# Patient Record
Sex: MALE | Race: WHITE
[De-identification: names, ages, dates, MRNs, and addresses within clinical notes are randomized per-mention and may not be internally consistent; named-entity substitution may affect disease eponyms.]

---

## 2019-01-25 ENCOUNTER — HOSPITAL ENCOUNTER (INPATIENT)
Dept: HOSPITAL 94 - ER | Age: 79
LOS: 1 days | Discharge: HOME | DRG: 640 | End: 2019-01-26
Attending: HOSPITALIST | Admitting: INTERNAL MEDICINE
Payer: MEDICARE

## 2019-01-25 VITALS — SYSTOLIC BLOOD PRESSURE: 154 MMHG | DIASTOLIC BLOOD PRESSURE: 74 MMHG

## 2019-01-25 VITALS — HEIGHT: 69 IN | WEIGHT: 171.52 LBS | BODY MASS INDEX: 25.4 KG/M2

## 2019-01-25 VITALS — DIASTOLIC BLOOD PRESSURE: 74 MMHG | SYSTOLIC BLOOD PRESSURE: 131 MMHG

## 2019-01-25 VITALS — DIASTOLIC BLOOD PRESSURE: 74 MMHG | SYSTOLIC BLOOD PRESSURE: 154 MMHG

## 2019-01-25 VITALS — SYSTOLIC BLOOD PRESSURE: 145 MMHG | DIASTOLIC BLOOD PRESSURE: 84 MMHG

## 2019-01-25 DIAGNOSIS — I10: ICD-10-CM

## 2019-01-25 DIAGNOSIS — G93.41: ICD-10-CM

## 2019-01-25 DIAGNOSIS — E78.5: ICD-10-CM

## 2019-01-25 DIAGNOSIS — E78.00: ICD-10-CM

## 2019-01-25 DIAGNOSIS — F32.9: ICD-10-CM

## 2019-01-25 DIAGNOSIS — R79.89: ICD-10-CM

## 2019-01-25 DIAGNOSIS — Z79.899: ICD-10-CM

## 2019-01-25 DIAGNOSIS — N39.0: ICD-10-CM

## 2019-01-25 DIAGNOSIS — E86.0: Primary | ICD-10-CM

## 2019-01-25 LAB
ALBUMIN SERPL BCP-MCNC: 3.7 G/DL (ref 3.4–5)
ALBUMIN/GLOB SERPL: 0.9 {RATIO} (ref 1.1–1.5)
ALP SERPL-CCNC: 109 IU/L (ref 46–116)
ALT SERPL W P-5'-P-CCNC: 17 U/L (ref 12–78)
AMORPH URATE CRY #/AREA URNS HPF: (no result) /[HPF]
ANION GAP SERPL CALCULATED.3IONS-SCNC: 12 MMOL/L (ref 8–16)
APTT PPP: 31 SECONDS (ref 22–32)
AST SERPL W P-5'-P-CCNC: 13 U/L (ref 10–37)
BACTERIA URNS QL MICRO: (no result) /HPF
BASOPHILS # BLD AUTO: 0 X10'3 (ref 0–0.2)
BASOPHILS NFR BLD AUTO: 0.3 % (ref 0–1)
BILIRUB SERPL-MCNC: 1 MG/DL (ref 0.1–1)
BUN SERPL-MCNC: 17 MG/DL (ref 7–18)
BUN/CREAT SERPL: 16.8 (ref 5.4–32)
CALCIUM SERPL-MCNC: 8.7 MG/DL (ref 8.5–10.1)
CAOX CRY URNS QL MICRO: (no result) /HPF
CHLORIDE SERPL-SCNC: 102 MMOL/L (ref 99–107)
CLARITY UR: (no result)
CO2 SERPL-SCNC: 24.1 MMOL/L (ref 24–32)
COLOR UR: YELLOW
CREAT SERPL-MCNC: 1.01 MG/DL (ref 0.6–1.1)
DEPRECATED SQUAMOUS URNS QL MICRO: (no result) /LPF
EOSINOPHIL # BLD AUTO: 0 X10'3 (ref 0–0.9)
EOSINOPHIL NFR BLD AUTO: 0.2 % (ref 0–6)
ERYTHROCYTE [DISTWIDTH] IN BLOOD BY AUTOMATED COUNT: 13.9 % (ref 11.5–14.5)
GFR SERPL CREATININE-BSD FRML MDRD: 71 ML/MIN
GLUCOSE SERPL-MCNC: 106 MG/DL (ref 70–104)
GLUCOSE UR STRIP-MCNC: NEGATIVE MG/DL
HCT VFR BLD AUTO: 50.7 % (ref 42–52)
HGB BLD-MCNC: 17.3 G/DL (ref 14–17.9)
HGB UR QL STRIP: (no result)
INR PPP: 1 INR
KETONES UR STRIP-MCNC: (no result) MG/DL
LEUKOCYTE ESTERASE UR QL STRIP: NEGATIVE
LYMPHOCYTES # BLD AUTO: 0.9 X10'3 (ref 1.1–4.8)
LYMPHOCYTES NFR BLD AUTO: 8.9 % (ref 21–51)
MCH RBC QN AUTO: 29.4 PG (ref 27–31)
MCHC RBC AUTO-ENTMCNC: 34.1 % (ref 33–36.5)
MCV RBC AUTO: 86.4 FL (ref 78–98)
MONOCYTES # BLD AUTO: 0.8 X10'3 (ref 0–0.9)
MONOCYTES NFR BLD AUTO: 8.7 % (ref 2–12)
MUCOUS THREADS URNS QL MICRO: (no result) /LPF
NEUTROPHILS # BLD AUTO: 7.9 X10'3 (ref 1.8–7.7)
NEUTROPHILS NFR BLD AUTO: 81.9 % (ref 42–75)
NITRITE UR QL STRIP: NEGATIVE
PH UR STRIP: 6 [PH] (ref 4.8–8)
PLATELET # BLD AUTO: 170 X10'3 (ref 140–440)
PMV BLD AUTO: 8.2 FL (ref 7.4–10.4)
POTASSIUM SERPL-SCNC: 3.6 MMOL/L (ref 3.5–5.1)
PROT SERPL-MCNC: 7.8 G/DL (ref 6.4–8.2)
PROT UR QL STRIP: 30 MG/DL
PROTHROMBIN TIME: 10.6 SECONDS (ref 9–12)
RBC # BLD AUTO: 5.87 X10'6 (ref 4.7–6.1)
RBC #/AREA URNS HPF: (no result) /HPF (ref 0–2)
SODIUM SERPL-SCNC: 138 MMOL/L (ref 135–145)
SP GR UR STRIP: >=1.03 (ref 1–1.03)
URN COLLECT METHOD CLASS: (no result)
UROBILINOGEN UR STRIP-MCNC: 0.2 E.U/DL (ref 0.2–1)
WBC # BLD AUTO: 9.6 X10'3 (ref 4.5–11)
WBC #/AREA URNS HPF: (no result) /HPF (ref 0–4)

## 2019-01-25 PROCEDURE — 93306 TTE W/DOPPLER COMPLETE: CPT

## 2019-01-25 PROCEDURE — 80048 BASIC METABOLIC PNL TOTAL CA: CPT

## 2019-01-25 PROCEDURE — 81001 URINALYSIS AUTO W/SCOPE: CPT

## 2019-01-25 PROCEDURE — 71045 X-RAY EXAM CHEST 1 VIEW: CPT

## 2019-01-25 PROCEDURE — 84484 ASSAY OF TROPONIN QUANT: CPT

## 2019-01-25 PROCEDURE — 97530 THERAPEUTIC ACTIVITIES: CPT

## 2019-01-25 PROCEDURE — 70551 MRI BRAIN STEM W/O DYE: CPT

## 2019-01-25 PROCEDURE — 70450 CT HEAD/BRAIN W/O DYE: CPT

## 2019-01-25 PROCEDURE — 36415 COLL VENOUS BLD VENIPUNCTURE: CPT

## 2019-01-25 PROCEDURE — 87088 URINE BACTERIA CULTURE: CPT

## 2019-01-25 PROCEDURE — 93880 EXTRACRANIAL BILAT STUDY: CPT

## 2019-01-25 PROCEDURE — 85025 COMPLETE CBC W/AUTO DIFF WBC: CPT

## 2019-01-25 PROCEDURE — 80053 COMPREHEN METABOLIC PANEL: CPT

## 2019-01-25 PROCEDURE — 85610 PROTHROMBIN TIME: CPT

## 2019-01-25 PROCEDURE — 93005 ELECTROCARDIOGRAM TRACING: CPT

## 2019-01-25 PROCEDURE — 97116 GAIT TRAINING THERAPY: CPT

## 2019-01-25 PROCEDURE — 80061 LIPID PANEL: CPT

## 2019-01-25 PROCEDURE — 99285 EMERGENCY DEPT VISIT HI MDM: CPT

## 2019-01-25 PROCEDURE — 87070 CULTURE OTHR SPECIMN AEROBIC: CPT

## 2019-01-25 PROCEDURE — 97162 PT EVAL MOD COMPLEX 30 MIN: CPT

## 2019-01-25 PROCEDURE — 96365 THER/PROPH/DIAG IV INF INIT: CPT

## 2019-01-25 PROCEDURE — 85730 THROMBOPLASTIN TIME PARTIAL: CPT

## 2019-01-25 PROCEDURE — 83735 ASSAY OF MAGNESIUM: CPT

## 2019-01-25 RX ADMIN — HEPARIN SODIUM SCH UNIT: 5000 INJECTION, SOLUTION INTRAVENOUS; SUBCUTANEOUS at 19:14

## 2019-01-25 NOTE — NUR
PAGER ID:  [phone] 

MESSAGE:  Constance Castillo in 6255i- MRI looks inconclusive, states infarct present 
on rt but also states diffusion images neg for stroke. There are no parameters to give 
lisinopril (permissive htn x 24 hrs?) pls call me thank you

-------------------------------------------------------------------------------

Addendum: 01/25/19 at 1824 by Marleny Banuelos RN

-------------------------------------------------------------------------------

per MD grider to hold lisinopril at this time

## 2019-01-26 VITALS — SYSTOLIC BLOOD PRESSURE: 132 MMHG | DIASTOLIC BLOOD PRESSURE: 68 MMHG

## 2019-01-26 VITALS — DIASTOLIC BLOOD PRESSURE: 76 MMHG | SYSTOLIC BLOOD PRESSURE: 145 MMHG

## 2019-01-26 VITALS — DIASTOLIC BLOOD PRESSURE: 77 MMHG | SYSTOLIC BLOOD PRESSURE: 125 MMHG

## 2019-01-26 LAB
ALBUMIN SERPL BCP-MCNC: 3.2 G/DL (ref 3.4–5)
ANION GAP SERPL CALCULATED.3IONS-SCNC: 14 MMOL/L (ref 8–16)
BASOPHILS # BLD AUTO: 0 X10'3 (ref 0–0.2)
BASOPHILS NFR BLD AUTO: 0.2 % (ref 0–1)
BUN SERPL-MCNC: 15 MG/DL (ref 7–18)
BUN/CREAT SERPL: 15.5 (ref 5.4–32)
CALCIUM SERPL-MCNC: 8.6 MG/DL (ref 8.5–10.1)
CHLORIDE SERPL-SCNC: 102 MMOL/L (ref 99–107)
CHOLEST SERPL-MCNC: 156 MG/DL (ref 0–200)
CHOLEST/HDLC SERPL: 4.1 {RATIO} (ref 0–4.99)
CO2 SERPL-SCNC: 23.6 MMOL/L (ref 24–32)
CREAT SERPL-MCNC: 0.97 MG/DL (ref 0.6–1.1)
EOSINOPHIL # BLD AUTO: 0.1 X10'3 (ref 0–0.9)
EOSINOPHIL NFR BLD AUTO: 1.6 % (ref 0–6)
ERYTHROCYTE [DISTWIDTH] IN BLOOD BY AUTOMATED COUNT: 13.9 % (ref 11.5–14.5)
GFR SERPL CREATININE-BSD FRML MDRD: 75 ML/MIN
GLUCOSE SERPL-MCNC: 103 MG/DL (ref 70–104)
HCT VFR BLD AUTO: 48.1 % (ref 42–52)
HDLC SERPL-MCNC: 38 MG/DL (ref 35–60)
HGB BLD-MCNC: 16.8 G/DL (ref 14–17.9)
LDLC SERPL DIRECT ASSAY-MCNC: 108 MG/DL (ref 50–100)
LYMPHOCYTES # BLD AUTO: 1 X10'3 (ref 1.1–4.8)
LYMPHOCYTES NFR BLD AUTO: 10.8 % (ref 21–51)
MAGNESIUM SERPL-MCNC: 1.7 MG/DL (ref 1.5–2.4)
MCH RBC QN AUTO: 29.7 PG (ref 27–31)
MCHC RBC AUTO-ENTMCNC: 34.9 % (ref 33–36.5)
MCV RBC AUTO: 85.1 FL (ref 78–98)
MONOCYTES # BLD AUTO: 1 X10'3 (ref 0–0.9)
MONOCYTES NFR BLD AUTO: 11.3 % (ref 2–12)
NEUTROPHILS # BLD AUTO: 7 X10'3 (ref 1.8–7.7)
NEUTROPHILS NFR BLD AUTO: 76.1 % (ref 42–75)
PLATELET # BLD AUTO: 164 X10'3 (ref 140–440)
PMV BLD AUTO: 8.5 FL (ref 7.4–10.4)
POTASSIUM SERPL-SCNC: 3.5 MMOL/L (ref 3.5–5.1)
RBC # BLD AUTO: 5.65 X10'6 (ref 4.7–6.1)
SODIUM SERPL-SCNC: 140 MMOL/L (ref 135–145)
TRIGL SERPL-MCNC: 94 MG/DL (ref 20–135)
WBC # BLD AUTO: 9.1 X10'3 (ref 4.5–11)

## 2019-01-26 RX ADMIN — HEPARIN SODIUM SCH UNIT: 5000 INJECTION, SOLUTION INTRAVENOUS; SUBCUTANEOUS at 10:25

## 2019-01-26 RX ADMIN — HEPARIN SODIUM SCH UNIT: 5000 INJECTION, SOLUTION INTRAVENOUS; SUBCUTANEOUS at 01:35

## 2019-01-26 NOTE — NUR
Spoke to Dr Gonzalez re pt's ambiguous MRI read from last night- he confirmed that there is 
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 
Discussed with Tracee the stroke RN and she agrees

## 2019-01-26 NOTE — NUR
PAGER ID:  [phone] 

MESSAGE:  Constance [phone] re Mr Harpreet quevedo in 6068x- His workup looks negative, family at 
bedside wondering about dc. Thank you

## 2019-01-26 NOTE — NUR
PAGER ID:  [phone] 

MESSAGE:  Constance [phone] re Harpreet Castillo in 1354y- Does he need home rx for asa? because of the 
prior stroke?

## 2020-01-15 ENCOUNTER — HOSPITAL ENCOUNTER (EMERGENCY)
Dept: HOSPITAL 94 - ER | Age: 80
Discharge: HOME | End: 2020-01-15
Payer: MEDICARE

## 2020-01-15 VITALS — DIASTOLIC BLOOD PRESSURE: 73 MMHG | SYSTOLIC BLOOD PRESSURE: 157 MMHG

## 2020-01-15 VITALS — BODY MASS INDEX: 29.07 KG/M2 | HEIGHT: 67 IN | WEIGHT: 185.19 LBS

## 2020-01-15 DIAGNOSIS — L03.116: Primary | ICD-10-CM

## 2020-01-15 DIAGNOSIS — R79.1: ICD-10-CM

## 2020-01-15 DIAGNOSIS — L03.115: ICD-10-CM

## 2020-01-15 DIAGNOSIS — F03.90: ICD-10-CM

## 2020-01-15 DIAGNOSIS — E78.00: ICD-10-CM

## 2020-01-15 DIAGNOSIS — Z79.82: ICD-10-CM

## 2020-01-15 DIAGNOSIS — Z79.899: ICD-10-CM

## 2020-01-15 LAB
ALBUMIN SERPL BCP-MCNC: 3.6 G/DL (ref 3.4–5)
ALBUMIN/GLOB SERPL: 0.8 {RATIO} (ref 1.1–1.5)
ALP SERPL-CCNC: 127 IU/L (ref 46–116)
ALT SERPL W P-5'-P-CCNC: 29 U/L (ref 12–78)
ANION GAP SERPL CALCULATED.3IONS-SCNC: 5 MMOL/L (ref 8–16)
APTT PPP: 28 SECONDS (ref 22–32)
AST SERPL W P-5'-P-CCNC: 22 U/L (ref 10–37)
BACTERIA URNS QL MICRO: (no result) /HPF
BASOPHILS # BLD AUTO: 0.1 X10'3 (ref 0–0.2)
BASOPHILS NFR BLD AUTO: 0.6 % (ref 0–1)
BILIRUB SERPL-MCNC: 0.6 MG/DL (ref 0.1–1)
BUN SERPL-MCNC: 18 MG/DL (ref 7–18)
BUN/CREAT SERPL: 19.1 (ref 5.4–32)
CALCIUM SERPL-MCNC: 9 MG/DL (ref 8.5–10.1)
CAOX CRY URNS QL MICRO: (no result) /HPF
CHLORIDE SERPL-SCNC: 107 MMOL/L (ref 99–107)
CLARITY UR: (no result)
CO2 SERPL-SCNC: 30.6 MMOL/L (ref 24–32)
COLOR UR: YELLOW
CREAT SERPL-MCNC: 0.94 MG/DL (ref 0.6–1.1)
DEPRECATED SQUAMOUS URNS QL MICRO: (no result) /LPF
EOSINOPHIL # BLD AUTO: 0.3 X10'3 (ref 0–0.9)
EOSINOPHIL NFR BLD AUTO: 2.9 % (ref 0–6)
ERYTHROCYTE [DISTWIDTH] IN BLOOD BY AUTOMATED COUNT: 14.8 % (ref 11.5–14.5)
GFR SERPL CREATININE-BSD FRML MDRD: 77 ML/MIN
GLUCOSE SERPL-MCNC: 96 MG/DL (ref 70–104)
GLUCOSE UR STRIP-MCNC: NEGATIVE MG/DL
HCT VFR BLD AUTO: 49 % (ref 42–52)
HGB BLD-MCNC: 16.6 G/DL (ref 14–17.9)
HGB UR QL STRIP: (no result)
KETONES UR STRIP-MCNC: NEGATIVE MG/DL
LEUKOCYTE ESTERASE UR QL STRIP: NEGATIVE
LYMPHOCYTES # BLD AUTO: 1.3 X10'3 (ref 1.1–4.8)
LYMPHOCYTES NFR BLD AUTO: 14.8 % (ref 21–51)
MAGNESIUM SERPL-MCNC: 2.2 MG/DL (ref 1.5–2.4)
MCH RBC QN AUTO: 28.4 PG (ref 27–31)
MCHC RBC AUTO-ENTMCNC: 33.8 G/DL (ref 33–36.5)
MCV RBC AUTO: 84 FL (ref 78–98)
MONOCYTES # BLD AUTO: 0.9 X10'3 (ref 0–0.9)
MONOCYTES NFR BLD AUTO: 9.9 % (ref 2–12)
MUCOUS THREADS URNS QL MICRO: (no result) /LPF
NEUTROPHILS # BLD AUTO: 6.5 X10'3 (ref 1.8–7.7)
NEUTROPHILS NFR BLD AUTO: 71.8 % (ref 42–75)
NITRITE UR QL STRIP: NEGATIVE
PH UR STRIP: 6 [PH] (ref 4.8–8)
PLATELET # BLD AUTO: 200 X10'3 (ref 140–440)
PMV BLD AUTO: 8.6 FL (ref 7.4–10.4)
POTASSIUM SERPL-SCNC: 3.6 MMOL/L (ref 3.5–5.1)
PROT SERPL-MCNC: 7.9 G/DL (ref 6.4–8.2)
PROT UR QL STRIP: 30 MG/DL
RBC # BLD AUTO: 5.84 X10'6 (ref 4.7–6.1)
RBC #/AREA URNS HPF: (no result) /HPF (ref 0–2)
SODIUM SERPL-SCNC: 143 MMOL/L (ref 135–145)
SP GR UR STRIP: 1.02 (ref 1–1.03)
TROPONIN I SERPL-MCNC: < 0.04 NG/ML (ref 0–0.05)
URN COLLECT METHOD CLASS: (no result)
UROBILINOGEN UR STRIP-MCNC: 0.2 E.U/DL (ref 0.2–1)
WBC # BLD AUTO: 9.1 X10'3 (ref 4.5–11)
WBC #/AREA URNS HPF: (no result) /HPF (ref 0–4)

## 2020-01-15 PROCEDURE — 99284 EMERGENCY DEPT VISIT MOD MDM: CPT

## 2020-01-15 PROCEDURE — 85610 PROTHROMBIN TIME: CPT

## 2020-01-15 PROCEDURE — 84484 ASSAY OF TROPONIN QUANT: CPT

## 2020-01-15 PROCEDURE — 71045 X-RAY EXAM CHEST 1 VIEW: CPT

## 2020-01-15 PROCEDURE — 85025 COMPLETE CBC W/AUTO DIFF WBC: CPT

## 2020-01-15 PROCEDURE — 93005 ELECTROCARDIOGRAM TRACING: CPT

## 2020-01-15 PROCEDURE — 84145 PROCALCITONIN (PCT): CPT

## 2020-01-15 PROCEDURE — 83605 ASSAY OF LACTIC ACID: CPT

## 2020-01-15 PROCEDURE — 81001 URINALYSIS AUTO W/SCOPE: CPT

## 2020-01-15 PROCEDURE — 83880 ASSAY OF NATRIURETIC PEPTIDE: CPT

## 2020-01-15 PROCEDURE — 36415 COLL VENOUS BLD VENIPUNCTURE: CPT

## 2020-01-15 PROCEDURE — 80053 COMPREHEN METABOLIC PANEL: CPT

## 2020-01-15 PROCEDURE — 85730 THROMBOPLASTIN TIME PARTIAL: CPT

## 2020-01-15 PROCEDURE — 87040 BLOOD CULTURE FOR BACTERIA: CPT

## 2020-01-15 PROCEDURE — 83735 ASSAY OF MAGNESIUM: CPT

## 2020-01-15 NOTE — NUR
Alfonoz "[employer] card given to me by EMS for transport home when needed. Patient 
is in rm 205, Contact Leopoldo Lovelace at [phone].

## 2020-05-01 ENCOUNTER — HOSPITAL ENCOUNTER (EMERGENCY)
Dept: HOSPITAL 94 - ER | Age: 80
Discharge: HOME | End: 2020-05-01
Payer: MEDICARE

## 2020-05-01 VITALS — WEIGHT: 230.49 LBS | HEIGHT: 70 IN | BODY MASS INDEX: 33 KG/M2

## 2020-05-01 VITALS — SYSTOLIC BLOOD PRESSURE: 142 MMHG | DIASTOLIC BLOOD PRESSURE: 61 MMHG

## 2020-05-01 DIAGNOSIS — R06.02: ICD-10-CM

## 2020-05-01 DIAGNOSIS — Z79.2: ICD-10-CM

## 2020-05-01 DIAGNOSIS — E78.00: ICD-10-CM

## 2020-05-01 DIAGNOSIS — R07.9: Primary | ICD-10-CM

## 2020-05-01 DIAGNOSIS — Z79.82: ICD-10-CM

## 2020-05-01 DIAGNOSIS — Z79.899: ICD-10-CM

## 2020-05-01 LAB
ALBUMIN SERPL BCP-MCNC: 3 G/DL (ref 3.4–5)
ALBUMIN/GLOB SERPL: 0.6 {RATIO} (ref 1.1–1.5)
ALP SERPL-CCNC: 127 IU/L (ref 46–116)
ALT SERPL W P-5'-P-CCNC: 18 U/L (ref 12–78)
ANION GAP SERPL CALCULATED.3IONS-SCNC: 5 MMOL/L (ref 8–16)
AST SERPL W P-5'-P-CCNC: 20 U/L (ref 10–37)
BASOPHILS # BLD AUTO: 0.1 X10'3 (ref 0–0.2)
BASOPHILS NFR BLD AUTO: 0.7 % (ref 0–1)
BILIRUB SERPL-MCNC: 0.2 MG/DL (ref 0.1–1)
BUN SERPL-MCNC: 12 MG/DL (ref 7–18)
BUN/CREAT SERPL: 9.4 (ref 5.4–32)
CALCIUM SERPL-MCNC: 8.9 MG/DL (ref 8.5–10.1)
CHLORIDE SERPL-SCNC: 104 MMOL/L (ref 99–107)
CO2 SERPL-SCNC: 31.1 MMOL/L (ref 24–32)
CREAT SERPL-MCNC: 1.27 MG/DL (ref 0.6–1.1)
EOSINOPHIL # BLD AUTO: 0.4 X10'3 (ref 0–0.9)
EOSINOPHIL NFR BLD AUTO: 3 % (ref 0–6)
ERYTHROCYTE [DISTWIDTH] IN BLOOD BY AUTOMATED COUNT: 16.1 % (ref 11.5–14.5)
GFR SERPL CREATININE-BSD FRML MDRD: 55 ML/MIN
GLUCOSE SERPL-MCNC: 106 MG/DL (ref 70–104)
HCT VFR BLD AUTO: 45 % (ref 42–52)
HGB BLD-MCNC: 14.7 G/DL (ref 14–17.9)
LYMPHOCYTES # BLD AUTO: 1.2 X10'3 (ref 1.1–4.8)
LYMPHOCYTES NFR BLD AUTO: 9.4 % (ref 21–51)
MCH RBC QN AUTO: 27 PG (ref 27–31)
MCHC RBC AUTO-ENTMCNC: 32.6 G/DL (ref 33–36.5)
MCV RBC AUTO: 82.9 FL (ref 78–98)
MONOCYTES # BLD AUTO: 1.2 X10'3 (ref 0–0.9)
MONOCYTES NFR BLD AUTO: 9.4 % (ref 2–12)
NEUTROPHILS # BLD AUTO: 9.8 X10'3 (ref 1.8–7.7)
NEUTROPHILS NFR BLD AUTO: 77.5 % (ref 42–75)
PLATELET # BLD AUTO: 270 X10'3 (ref 140–440)
PMV BLD AUTO: 8 FL (ref 7.4–10.4)
POTASSIUM SERPL-SCNC: 4.4 MMOL/L (ref 3.5–5.1)
PROT SERPL-MCNC: 7.8 G/DL (ref 6.4–8.2)
RBC # BLD AUTO: 5.43 X10'6 (ref 4.7–6.1)
SODIUM SERPL-SCNC: 140 MMOL/L (ref 135–145)
WBC # BLD AUTO: 12.7 X10'3 (ref 4.5–11)

## 2020-05-01 PROCEDURE — 85025 COMPLETE CBC W/AUTO DIFF WBC: CPT

## 2020-05-01 PROCEDURE — 36415 COLL VENOUS BLD VENIPUNCTURE: CPT

## 2020-05-01 PROCEDURE — 80053 COMPREHEN METABOLIC PANEL: CPT

## 2020-05-01 PROCEDURE — 84484 ASSAY OF TROPONIN QUANT: CPT

## 2020-05-01 PROCEDURE — 93005 ELECTROCARDIOGRAM TRACING: CPT

## 2020-05-01 PROCEDURE — 71045 X-RAY EXAM CHEST 1 VIEW: CPT

## 2020-05-01 PROCEDURE — 99285 EMERGENCY DEPT VISIT HI MDM: CPT

## 2020-05-01 NOTE — NUR
Daniel called back and said that S[facility] the caregiver would be picking the patient 
up in about 15 minutes

## 2020-05-01 NOTE — NUR
patients POA named Daniel is going to pick this patient up and take him back to 
the facility he came from. Patient awaiting ride and has discharge orders.

## 2020-06-18 ENCOUNTER — HOSPITAL ENCOUNTER (OUTPATIENT)
Dept: HOSPITAL 94 - WOUND CARE | Age: 80
Discharge: HOME | End: 2020-06-18
Attending: NURSE PRACTITIONER
Payer: MEDICARE

## 2020-06-18 DIAGNOSIS — Z90.49: ICD-10-CM

## 2020-06-18 DIAGNOSIS — Z79.82: ICD-10-CM

## 2020-06-18 DIAGNOSIS — I83.011: Primary | ICD-10-CM

## 2020-06-18 DIAGNOSIS — E78.00: ICD-10-CM

## 2020-06-18 DIAGNOSIS — Z79.2: ICD-10-CM

## 2020-06-18 DIAGNOSIS — L97.821: ICD-10-CM

## 2020-06-18 DIAGNOSIS — Z79.899: ICD-10-CM

## 2020-06-18 DIAGNOSIS — F03.90: ICD-10-CM

## 2020-06-18 DIAGNOSIS — I83.012: ICD-10-CM

## 2020-06-18 DIAGNOSIS — I10: ICD-10-CM

## 2020-06-18 DIAGNOSIS — L97.211: ICD-10-CM

## 2020-06-18 DIAGNOSIS — L97.111: ICD-10-CM

## 2020-06-18 DIAGNOSIS — I83.018: ICD-10-CM

## 2020-06-18 DIAGNOSIS — I83.028: ICD-10-CM

## 2020-06-18 DIAGNOSIS — L97.811: ICD-10-CM

## 2020-06-18 LAB
ALBUMIN SERPL BCP-MCNC: 3 G/DL (ref 3.4–5)
ALBUMIN/GLOB SERPL: 0.6 {RATIO} (ref 1.1–1.5)
ALP SERPL-CCNC: 121 IU/L (ref 46–116)
ALT SERPL W P-5'-P-CCNC: 19 U/L (ref 12–78)
ANION GAP SERPL CALCULATED.3IONS-SCNC: 7 MMOL/L (ref 8–16)
AST SERPL W P-5'-P-CCNC: 20 U/L (ref 10–37)
BASOPHILS # BLD AUTO: 0 X10'3 (ref 0–0.2)
BASOPHILS NFR BLD AUTO: 0.3 % (ref 0–1)
BILIRUB SERPL-MCNC: 0.3 MG/DL (ref 0.1–1)
BUN SERPL-MCNC: 12 MG/DL (ref 7–18)
BUN/CREAT SERPL: 10.9 (ref 5.4–32)
CALCIUM SERPL-MCNC: 9.2 MG/DL (ref 8.5–10.1)
CHLORIDE SERPL-SCNC: 104 MMOL/L (ref 99–107)
CO2 SERPL-SCNC: 28.9 MMOL/L (ref 24–32)
CREAT SERPL-MCNC: 1.1 MG/DL (ref 0.6–1.1)
CRP SERPL HS-MCNC: 2.8 MG/DL (ref 0–0.5)
EOSINOPHIL # BLD AUTO: 0.4 X10'3 (ref 0–0.9)
EOSINOPHIL NFR BLD AUTO: 2.7 % (ref 0–6)
ERYTHROCYTE [DISTWIDTH] IN BLOOD BY AUTOMATED COUNT: 15.6 % (ref 11.5–14.5)
GFR SERPL CREATININE-BSD FRML MDRD: 64 ML/MIN
GLUCOSE SERPL-MCNC: 91 MG/DL (ref 70–104)
HBA1C MFR BLD: 5.9 % (ref 4.5–6.2)
HCT VFR BLD AUTO: 42.7 % (ref 42–52)
HGB BLD-MCNC: 14 G/DL (ref 14–17.9)
LYMPHOCYTES # BLD AUTO: 1 X10'3 (ref 1.1–4.8)
LYMPHOCYTES NFR BLD AUTO: 7.6 % (ref 21–51)
MCH RBC QN AUTO: 26.9 PG (ref 27–31)
MCHC RBC AUTO-ENTMCNC: 32.8 G/DL (ref 33–36.5)
MCV RBC AUTO: 82.1 FL (ref 78–98)
MONOCYTES # BLD AUTO: 1.2 X10'3 (ref 0–0.9)
MONOCYTES NFR BLD AUTO: 9.3 % (ref 2–12)
NEUTROPHILS # BLD AUTO: 10.3 X10'3 (ref 1.8–7.7)
NEUTROPHILS NFR BLD AUTO: 80.1 % (ref 42–75)
PLATELET # BLD AUTO: 289 X10'3 (ref 140–440)
PMV BLD AUTO: 8.2 FL (ref 7.4–10.4)
POTASSIUM SERPL-SCNC: 4.2 MMOL/L (ref 3.5–5.1)
PROT SERPL-MCNC: 8.2 G/DL (ref 6.4–8.2)
RBC # BLD AUTO: 5.2 X10'6 (ref 4.7–6.1)
SODIUM SERPL-SCNC: 140 MMOL/L (ref 135–145)
WBC # BLD AUTO: 12.9 X10'3 (ref 4.5–11)

## 2020-06-18 PROCEDURE — 29580 STRAPPING UNNA BOOT: CPT

## 2020-06-18 PROCEDURE — 85651 RBC SED RATE NONAUTOMATED: CPT

## 2020-06-18 PROCEDURE — 86140 C-REACTIVE PROTEIN: CPT

## 2020-06-18 PROCEDURE — 85025 COMPLETE CBC W/AUTO DIFF WBC: CPT

## 2020-06-18 PROCEDURE — 36415 COLL VENOUS BLD VENIPUNCTURE: CPT

## 2020-06-18 PROCEDURE — 83036 HEMOGLOBIN GLYCOSYLATED A1C: CPT

## 2020-06-18 PROCEDURE — 80053 COMPREHEN METABOLIC PANEL: CPT

## 2020-06-25 ENCOUNTER — HOSPITAL ENCOUNTER (OUTPATIENT)
Dept: HOSPITAL 94 - WOUND CARE | Age: 80
Discharge: HOME | End: 2020-06-25
Attending: NURSE PRACTITIONER
Payer: MEDICARE

## 2020-06-25 DIAGNOSIS — L97.111: ICD-10-CM

## 2020-06-25 DIAGNOSIS — L97.811: ICD-10-CM

## 2020-06-25 DIAGNOSIS — Z79.899: ICD-10-CM

## 2020-06-25 DIAGNOSIS — L97.821: ICD-10-CM

## 2020-06-25 DIAGNOSIS — F03.90: ICD-10-CM

## 2020-06-25 DIAGNOSIS — L97.211: ICD-10-CM

## 2020-06-25 DIAGNOSIS — Z79.2: ICD-10-CM

## 2020-06-25 DIAGNOSIS — I10: ICD-10-CM

## 2020-06-25 DIAGNOSIS — Z90.49: ICD-10-CM

## 2020-06-25 DIAGNOSIS — E78.00: ICD-10-CM

## 2020-06-25 DIAGNOSIS — I83.011: ICD-10-CM

## 2020-06-25 DIAGNOSIS — Z79.82: ICD-10-CM

## 2020-06-25 DIAGNOSIS — I83.012: Primary | ICD-10-CM

## 2020-06-25 PROCEDURE — 93922 UPR/L XTREMITY ART 2 LEVELS: CPT

## 2020-06-25 PROCEDURE — 29581 APPL MULTLAYER CMPRN SYS LEG: CPT

## 2020-06-25 PROCEDURE — 93925 LOWER EXTREMITY STUDY: CPT

## 2020-07-01 ENCOUNTER — HOSPITAL ENCOUNTER (OUTPATIENT)
Dept: HOSPITAL 94 - WOUND CARE | Age: 80
Discharge: HOME | End: 2020-07-01
Attending: NURSE PRACTITIONER
Payer: MEDICARE

## 2020-07-01 DIAGNOSIS — L97.821: ICD-10-CM

## 2020-07-01 DIAGNOSIS — I83.011: ICD-10-CM

## 2020-07-01 DIAGNOSIS — E78.00: ICD-10-CM

## 2020-07-01 DIAGNOSIS — F03.90: ICD-10-CM

## 2020-07-01 DIAGNOSIS — I10: ICD-10-CM

## 2020-07-01 DIAGNOSIS — L97.211: ICD-10-CM

## 2020-07-01 DIAGNOSIS — Z79.2: ICD-10-CM

## 2020-07-01 DIAGNOSIS — L97.811: ICD-10-CM

## 2020-07-01 DIAGNOSIS — I83.012: Primary | ICD-10-CM

## 2020-07-01 DIAGNOSIS — Z90.49: ICD-10-CM

## 2020-07-01 DIAGNOSIS — Z79.82: ICD-10-CM

## 2020-07-01 DIAGNOSIS — L97.111: ICD-10-CM

## 2020-07-01 DIAGNOSIS — Z79.899: ICD-10-CM

## 2020-07-07 ENCOUNTER — HOSPITAL ENCOUNTER (OUTPATIENT)
Dept: HOSPITAL 94 - WOUND CARE | Age: 80
Discharge: HOME | End: 2020-07-07
Attending: NURSE PRACTITIONER
Payer: MEDICARE

## 2020-07-07 DIAGNOSIS — L97.821: ICD-10-CM

## 2020-07-07 DIAGNOSIS — I83.012: Primary | ICD-10-CM

## 2020-07-07 DIAGNOSIS — Z79.82: ICD-10-CM

## 2020-07-07 DIAGNOSIS — L97.111: ICD-10-CM

## 2020-07-07 DIAGNOSIS — Z90.49: ICD-10-CM

## 2020-07-07 DIAGNOSIS — Z79.2: ICD-10-CM

## 2020-07-07 DIAGNOSIS — I83.011: ICD-10-CM

## 2020-07-07 DIAGNOSIS — E78.00: ICD-10-CM

## 2020-07-07 DIAGNOSIS — I10: ICD-10-CM

## 2020-07-07 DIAGNOSIS — L97.811: ICD-10-CM

## 2020-07-07 DIAGNOSIS — F03.90: ICD-10-CM

## 2020-07-07 DIAGNOSIS — L97.211: ICD-10-CM

## 2020-07-07 DIAGNOSIS — I87.8: ICD-10-CM

## 2020-07-07 DIAGNOSIS — Z79.899: ICD-10-CM

## 2020-07-07 PROCEDURE — 29580 STRAPPING UNNA BOOT: CPT

## 2020-07-14 ENCOUNTER — HOSPITAL ENCOUNTER (OUTPATIENT)
Dept: HOSPITAL 94 - WOUND CARE | Age: 80
Discharge: HOME | End: 2020-07-14
Attending: NURSE PRACTITIONER
Payer: MEDICARE

## 2020-07-14 DIAGNOSIS — F03.90: ICD-10-CM

## 2020-07-14 DIAGNOSIS — L97.111: ICD-10-CM

## 2020-07-14 DIAGNOSIS — L97.811: ICD-10-CM

## 2020-07-14 DIAGNOSIS — Z79.82: ICD-10-CM

## 2020-07-14 DIAGNOSIS — Z79.2: ICD-10-CM

## 2020-07-14 DIAGNOSIS — L97.211: ICD-10-CM

## 2020-07-14 DIAGNOSIS — I83.011: ICD-10-CM

## 2020-07-14 DIAGNOSIS — I83.028: ICD-10-CM

## 2020-07-14 DIAGNOSIS — I87.8: ICD-10-CM

## 2020-07-14 DIAGNOSIS — I10: ICD-10-CM

## 2020-07-14 DIAGNOSIS — Z79.899: ICD-10-CM

## 2020-07-14 DIAGNOSIS — I83.018: ICD-10-CM

## 2020-07-14 DIAGNOSIS — Z90.49: ICD-10-CM

## 2020-07-14 DIAGNOSIS — I83.012: Primary | ICD-10-CM

## 2020-07-14 DIAGNOSIS — L97.821: ICD-10-CM

## 2020-07-14 DIAGNOSIS — E78.00: ICD-10-CM

## 2020-07-14 PROCEDURE — 29581 APPL MULTLAYER CMPRN SYS LEG: CPT

## 2020-07-28 ENCOUNTER — HOSPITAL ENCOUNTER (OUTPATIENT)
Dept: HOSPITAL 94 - WOUND CARE | Age: 80
Discharge: HOME | End: 2020-07-28
Attending: NURSE PRACTITIONER
Payer: MEDICARE

## 2020-07-28 DIAGNOSIS — Z79.2: ICD-10-CM

## 2020-07-28 DIAGNOSIS — I87.8: ICD-10-CM

## 2020-07-28 DIAGNOSIS — I83.011: ICD-10-CM

## 2020-07-28 DIAGNOSIS — L97.111: ICD-10-CM

## 2020-07-28 DIAGNOSIS — E78.00: ICD-10-CM

## 2020-07-28 DIAGNOSIS — Z90.49: ICD-10-CM

## 2020-07-28 DIAGNOSIS — L97.811: ICD-10-CM

## 2020-07-28 DIAGNOSIS — I83.018: ICD-10-CM

## 2020-07-28 DIAGNOSIS — Z79.82: ICD-10-CM

## 2020-07-28 DIAGNOSIS — F03.90: ICD-10-CM

## 2020-07-28 DIAGNOSIS — L97.821: ICD-10-CM

## 2020-07-28 DIAGNOSIS — I83.015: ICD-10-CM

## 2020-07-28 DIAGNOSIS — L97.211: ICD-10-CM

## 2020-07-28 DIAGNOSIS — I10: ICD-10-CM

## 2020-07-28 DIAGNOSIS — I83.012: Primary | ICD-10-CM

## 2020-07-28 DIAGNOSIS — L89.899: ICD-10-CM

## 2020-07-28 DIAGNOSIS — I83.028: ICD-10-CM

## 2020-07-28 DIAGNOSIS — L97.511: ICD-10-CM

## 2020-07-28 DIAGNOSIS — Z79.899: ICD-10-CM

## 2020-07-28 PROCEDURE — 29581 APPL MULTLAYER CMPRN SYS LEG: CPT

## 2020-08-28 ENCOUNTER — HOSPITAL ENCOUNTER (OUTPATIENT)
Dept: HOSPITAL 94 - WOUND CARE | Age: 80
Discharge: HOME | End: 2020-08-28
Attending: NURSE PRACTITIONER
Payer: MEDICARE

## 2020-08-28 DIAGNOSIS — F03.90: ICD-10-CM

## 2020-08-28 DIAGNOSIS — L97.811: ICD-10-CM

## 2020-08-28 DIAGNOSIS — Z79.2: ICD-10-CM

## 2020-08-28 DIAGNOSIS — Z90.89: ICD-10-CM

## 2020-08-28 DIAGNOSIS — Z79.899: ICD-10-CM

## 2020-08-28 DIAGNOSIS — I87.8: ICD-10-CM

## 2020-08-28 DIAGNOSIS — I83.011: ICD-10-CM

## 2020-08-28 DIAGNOSIS — L97.211: ICD-10-CM

## 2020-08-28 DIAGNOSIS — Z90.49: ICD-10-CM

## 2020-08-28 DIAGNOSIS — I83.012: ICD-10-CM

## 2020-08-28 DIAGNOSIS — L97.111: ICD-10-CM

## 2020-08-28 DIAGNOSIS — Z79.82: ICD-10-CM

## 2020-08-28 DIAGNOSIS — I83.028: ICD-10-CM

## 2020-08-28 DIAGNOSIS — E78.00: ICD-10-CM

## 2020-08-28 DIAGNOSIS — I83.018: Primary | ICD-10-CM

## 2020-08-28 DIAGNOSIS — I10: ICD-10-CM

## 2020-08-28 DIAGNOSIS — L97.821: ICD-10-CM

## 2020-08-28 PROCEDURE — 29580 STRAPPING UNNA BOOT: CPT

## 2020-08-28 PROCEDURE — 87186 SC STD MICRODIL/AGAR DIL: CPT

## 2020-08-28 PROCEDURE — 87075 CULTR BACTERIA EXCEPT BLOOD: CPT

## 2020-08-28 PROCEDURE — 87070 CULTURE OTHR SPECIMN AEROBIC: CPT

## 2020-08-28 PROCEDURE — 87102 FUNGUS ISOLATION CULTURE: CPT

## 2020-08-28 PROCEDURE — 87077 CULTURE AEROBIC IDENTIFY: CPT

## 2020-09-04 ENCOUNTER — HOSPITAL ENCOUNTER (OUTPATIENT)
Dept: HOSPITAL 94 - WOUND CARE | Age: 80
Discharge: HOME | End: 2020-09-04
Attending: NURSE PRACTITIONER
Payer: MEDICARE

## 2020-09-04 DIAGNOSIS — Z90.49: ICD-10-CM

## 2020-09-04 DIAGNOSIS — I83.011: ICD-10-CM

## 2020-09-04 DIAGNOSIS — L97.211: ICD-10-CM

## 2020-09-04 DIAGNOSIS — I83.018: Primary | ICD-10-CM

## 2020-09-04 DIAGNOSIS — L97.111: ICD-10-CM

## 2020-09-04 DIAGNOSIS — I10: ICD-10-CM

## 2020-09-04 DIAGNOSIS — Z79.899: ICD-10-CM

## 2020-09-04 DIAGNOSIS — I83.012: ICD-10-CM

## 2020-09-04 DIAGNOSIS — F03.90: ICD-10-CM

## 2020-09-04 DIAGNOSIS — Z79.82: ICD-10-CM

## 2020-09-04 DIAGNOSIS — Z90.89: ICD-10-CM

## 2020-09-04 DIAGNOSIS — L97.821: ICD-10-CM

## 2020-09-04 DIAGNOSIS — Z79.2: ICD-10-CM

## 2020-09-04 DIAGNOSIS — I83.028: ICD-10-CM

## 2020-09-04 DIAGNOSIS — E78.00: ICD-10-CM

## 2020-09-04 DIAGNOSIS — I87.8: ICD-10-CM

## 2020-09-04 DIAGNOSIS — L97.811: ICD-10-CM

## 2020-09-04 PROCEDURE — 29580 STRAPPING UNNA BOOT: CPT

## 2020-09-11 ENCOUNTER — HOSPITAL ENCOUNTER (OUTPATIENT)
Dept: HOSPITAL 94 - WOUND CARE | Age: 80
Discharge: HOME | End: 2020-09-11
Attending: NURSE PRACTITIONER
Payer: MEDICARE

## 2020-09-11 DIAGNOSIS — L97.511: ICD-10-CM

## 2020-09-11 DIAGNOSIS — I83.015: ICD-10-CM

## 2020-09-11 DIAGNOSIS — I83.012: ICD-10-CM

## 2020-09-11 DIAGNOSIS — I83.011: ICD-10-CM

## 2020-09-11 DIAGNOSIS — L97.821: ICD-10-CM

## 2020-09-11 DIAGNOSIS — Z79.899: ICD-10-CM

## 2020-09-11 DIAGNOSIS — L89.899: ICD-10-CM

## 2020-09-11 DIAGNOSIS — Z79.82: ICD-10-CM

## 2020-09-11 DIAGNOSIS — Z90.49: ICD-10-CM

## 2020-09-11 DIAGNOSIS — I10: ICD-10-CM

## 2020-09-11 DIAGNOSIS — F03.90: ICD-10-CM

## 2020-09-11 DIAGNOSIS — L97.111: ICD-10-CM

## 2020-09-11 DIAGNOSIS — I83.028: ICD-10-CM

## 2020-09-11 DIAGNOSIS — I83.018: Primary | ICD-10-CM

## 2020-09-11 DIAGNOSIS — L97.211: ICD-10-CM

## 2020-09-11 DIAGNOSIS — Z79.2: ICD-10-CM

## 2020-09-11 DIAGNOSIS — L97.811: ICD-10-CM

## 2020-09-11 DIAGNOSIS — E78.00: ICD-10-CM

## 2020-09-11 PROCEDURE — 29581 APPL MULTLAYER CMPRN SYS LEG: CPT

## 2020-09-20 ENCOUNTER — HOSPITAL ENCOUNTER (EMERGENCY)
Dept: HOSPITAL 94 - ER | Age: 80
Discharge: HOME | End: 2020-09-20
Payer: MEDICARE

## 2020-09-20 VITALS — SYSTOLIC BLOOD PRESSURE: 133 MMHG | DIASTOLIC BLOOD PRESSURE: 63 MMHG

## 2020-09-20 DIAGNOSIS — S09.90XA: Primary | ICD-10-CM

## 2020-09-20 DIAGNOSIS — Z79.82: ICD-10-CM

## 2020-09-20 DIAGNOSIS — F03.90: ICD-10-CM

## 2020-09-20 DIAGNOSIS — Z79.899: ICD-10-CM
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
## 2020-09-20 DIAGNOSIS — E78.00: ICD-10-CM
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
## 2020-09-20 DIAGNOSIS — N39.0: ICD-10-CM

## 2020-09-20 LAB
ALBUMIN SERPL BCP-MCNC: 2.8 G/DL (ref 3.4–5)
ALBUMIN/GLOB SERPL: 0.6 {RATIO} (ref 1.1–1.5)
ALP SERPL-CCNC: 120 IU/L (ref 46–116)
ALT SERPL W P-5'-P-CCNC: 22 U/L (ref 12–78)
ANION GAP SERPL CALCULATED.3IONS-SCNC: 8 MMOL/L (ref 8–16)
APTT PPP: 31 SECONDS (ref 22–32)
AST SERPL W P-5'-P-CCNC: 22 U/L (ref 10–37)
BACTERIA URNS QL MICRO: (no result) /HPF
BASOPHILS # BLD AUTO: 0.3 X10'3 (ref 0–0.2)
BASOPHILS NFR BLD AUTO: 1.2 % (ref 0–1)
BILIRUB SERPL-MCNC: 0.9 MG/DL (ref 0.1–1)
BUN SERPL-MCNC: 20 MG/DL (ref 7–18)
BUN/CREAT SERPL: 18.3 (ref 5.4–32)
CALCIUM SERPL-MCNC: 8.5 MG/DL (ref 8.5–10.1)
CHLORIDE SERPL-SCNC: 99 MMOL/L (ref 99–107)
CLARITY UR: (no result)
CO2 SERPL-SCNC: 25.4 MMOL/L (ref 24–32)
COLOR UR: YELLOW
CREAT SERPL-MCNC: 1.09 MG/DL (ref 0.6–1.1)
DEPRECATED SQUAMOUS URNS QL MICRO: (no result) /LPF
EOSINOPHIL # BLD AUTO: 0 X10'3 (ref 0–0.9)
EOSINOPHIL NFR BLD AUTO: 0.1 % (ref 0–6)
ERYTHROCYTE [DISTWIDTH] IN BLOOD BY AUTOMATED COUNT: 15.8 % (ref 11.5–14.5)
GFR SERPL CREATININE-BSD FRML MDRD: 65 ML/MIN
GLUCOSE SERPL-MCNC: 112 MG/DL (ref 70–104)
GLUCOSE UR STRIP-MCNC: NEGATIVE MG/DL
HCT VFR BLD AUTO: 40.6 % (ref 42–52)
HGB BLD-MCNC: 13.5 G/DL (ref 14–17.9)
HGB UR QL STRIP: (no result)
KETONES UR STRIP-MCNC: (no result) MG/DL
LEUKOCYTE ESTERASE UR QL STRIP: (no result)
LYMPHOCYTES # BLD AUTO: 0.5 X10'3 (ref 1.1–4.8)
LYMPHOCYTES NFR BLD AUTO: 2.4 % (ref 21–51)
MCH RBC QN AUTO: 27.1 PG (ref 27–31)
MCHC RBC AUTO-ENTMCNC: 33.2 G/DL (ref 33–36.5)
MCV RBC AUTO: 81.7 FL (ref 78–98)
MONOCYTES # BLD AUTO: 1.7 X10'3 (ref 0–0.9)
MONOCYTES NFR BLD AUTO: 7.8 % (ref 2–12)
MUCOUS THREADS URNS QL MICRO: (no result) /LPF
NEUTROPHILS # BLD AUTO: 18.9 X10'3 (ref 1.8–7.7)
NEUTROPHILS NFR BLD AUTO: 88.5 % (ref 42–75)
NITRITE UR QL STRIP: POSITIVE
PH UR STRIP: 5.5 [PH] (ref 4.8–8)
PLATELET # BLD AUTO: 196 X10'3 (ref 140–440)
PMV BLD AUTO: 8 FL (ref 7.4–10.4)
POTASSIUM SERPL-SCNC: 3.7 MMOL/L (ref 3.5–5.1)
PROT SERPL-MCNC: 7.8 G/DL (ref 6.4–8.2)
PROT UR QL STRIP: 100 MG/DL
RBC # BLD AUTO: 4.97 X10'6 (ref 4.7–6.1)
RBC #/AREA URNS HPF: (no result) /HPF (ref 0–2)
SODIUM SERPL-SCNC: 132 MMOL/L (ref 135–145)
SP GR UR STRIP: >=1.03 (ref 1–1.03)
URN COLLECT METHOD CLASS: (no result)
UROBILINOGEN UR STRIP-MCNC: 1 E.U/DL (ref 0.2–1)
WBC # BLD AUTO: 21.4 X10'3 (ref 4.5–11)
WBC #/AREA URNS HPF: (no result) /HPF (ref 0–4)

## 2020-09-20 PROCEDURE — 36415 COLL VENOUS BLD VENIPUNCTURE: CPT

## 2020-09-20 PROCEDURE — 85730 THROMBOPLASTIN TIME PARTIAL: CPT

## 2020-09-20 PROCEDURE — 72125 CT NECK SPINE W/O DYE: CPT

## 2020-09-20 PROCEDURE — 87040 BLOOD CULTURE FOR BACTERIA: CPT

## 2020-09-20 PROCEDURE — 96365 THER/PROPH/DIAG IV INF INIT: CPT

## 2020-09-20 PROCEDURE — 87077 CULTURE AEROBIC IDENTIFY: CPT

## 2020-09-20 PROCEDURE — 87186 SC STD MICRODIL/AGAR DIL: CPT

## 2020-09-20 PROCEDURE — 71045 X-RAY EXAM CHEST 1 VIEW: CPT

## 2020-09-20 PROCEDURE — 83605 ASSAY OF LACTIC ACID: CPT

## 2020-09-20 PROCEDURE — 80053 COMPREHEN METABOLIC PANEL: CPT

## 2020-09-20 PROCEDURE — 70450 CT HEAD/BRAIN W/O DYE: CPT

## 2020-09-20 PROCEDURE — 93005 ELECTROCARDIOGRAM TRACING: CPT

## 2020-09-20 PROCEDURE — 81001 URINALYSIS AUTO W/SCOPE: CPT

## 2020-09-20 PROCEDURE — 85610 PROTHROMBIN TIME: CPT

## 2020-09-20 PROCEDURE — 87088 URINE BACTERIA CULTURE: CPT

## 2020-09-20 PROCEDURE — 85025 COMPLETE CBC W/AUTO DIFF WBC: CPT

## 2020-09-20 PROCEDURE — 99285 EMERGENCY DEPT VISIT HI MDM: CPT

## 2021-05-25 NOTE — NUR
Gave report to Marleny at [facility] and reported plan of care, pt being 
tx for UTI and to be DC'd back to facility. [facility] to be called.

## 2021-09-28 NOTE — NUR
CALLED [facility] ABOUT TRANSPORT FOR PT BACK TO [place] [facility], WAS TOLD 
POWER OF  HAS NOT YET RESPONDED AND THEY ARE IN CHARGE OF PT TRANSPORT 
BACK TO FACILITY. THEY WILL CALL AS SOON AS THEY ARE ABLE TO CONTACT POA. NURSE 
WAS MADE AWARE OF CONVERSATION.

## 2021-11-06 NOTE — NUR
Problems reprioritized. Patient report given, questions answered & plan of care reviewed 
with AYANNA Beaulieu.

## 2021-11-06 NOTE — NUR
Stool appears dark/tarry. Hemogram done at bedside by Dr. Shaffer, negative 
results. Orders to DC IV protonix.

## 2021-11-06 NOTE — NUR
Patient in room [facility]. I have received report from MELLISA URENA   and had the opportunity to 
ask questions and assume patient care.

-------------------------------------------------------------------------------

Addendum: 11/06/21 at 1838 by Brittnee Stallworth RN

-------------------------------------------------------------------------------

Amended: Links added.

## 2021-11-06 NOTE — NUR
Pt arrived to surgical floor via gurney. Transferred to hospital bed. 1 bag of belongings 
and slippers at bedside.

## 2021-11-06 NOTE — NUR
pt inc of stool skin care done and diaphoretic. as a precaution blood sugar check done and 
noted it to be 101. stool sent to lab for occult blood as ordered dark tarry and greenish in 
appearance.  bp retaken and now increased  from 96/35 &88/32 to 122/52 temp at 98.8 ax. pt 
coughed and sats up to 97% on 2 liters. flow meter given to assist with this.

## 2021-11-07 NOTE — NUR
Malnutrition consult: Pt admitted from previous senior living facility w/ increasing SOB, pt 
w/ hx of dementia per EMR. Current scaled wt 88.6kg compared to last scaled wt 77.8kg in 
January 2019. Upon assessment, pt sleeping and did not wake for interview. Pt observed at 
bedside w/ possible mild temporal wasting though unsure of baseline. No other signs of 
muscle/fat wasting observed. Pt currently NPO, no edema noted. At this time, pt does not 
meet minimum criteria for malnutrition, will continue to monitor.

-------------------------------------------------------------------------------

Addendum: 11/07/21 at 1244 by Con Medina RD

-------------------------------------------------------------------------------

Amended: Links added.

## 2021-11-07 NOTE — NUR
inc of stool liquid like with consistency. hs care done lien change and mouth care done 
yanker set up to get what pt coughed up with encouragement. cough weak. has an intermittent 
audible expiratory wheeze using flutter valve with encouragement. taking po water well at 
this time. mouth dry with crust from mouth breathing and had been npo now on a regular diet 
with aspiration precautions. no s&s of aspiration with drinking water and eating a yogurt 
fed to him earlier.

## 2021-11-07 NOTE — NUR
Patient in room [facility]. I have received report from  AYANNA Beaulieu  and had the opportunity 
to ask questions and assume patient care.

## 2021-11-07 NOTE — NUR
Problems reprioritized. Patient report given, questions answered & plan of care reviewed 
with TABITHA URENA. 

-------------------------------------------------------------------------------

Addendum: 11/07/21 at 0629 by Brittnee Stallworth RN

-------------------------------------------------------------------------------

Amended: Links added.

## 2021-11-07 NOTE — NUR
Dr. Johnson in to see patient and aware patient's H/H is 7/22.3 and vital signs. Per Dr. Johnson he will look to see if patient has been on iron supplement in the past and no 
need to transfuse at this time. Will continue to monitor.

## 2021-11-07 NOTE — NUR
Student documentation:

I have reviewed and agree with all interventions, assessments performed and documented by 
SN Yahaira.

Student Medication Administration:

For this medication-pass time frame, all medication were reviewed, dispensed, administered 
and documented per hospital policy by SN Yahaira.

## 2021-11-07 NOTE — NUR
Received call from Dr. Johnson that he had spoken to patient's son Daniel and Daniel ok with 
blood transfusion. Called son Daniel to confirm telephone consent with second RN Paulina.

## 2021-11-08 NOTE — NUR
Patient in room [facility]. I have received report from Brittnee URENA   and had the opportunity to 
ask questions and assume patient care.

## 2021-11-08 NOTE — NUR
pt had positive blood cultures page to Dr Behl and notified of gram negative rods no change 
in orders since pt on Rocephin and Zithromax for treatment.

## 2021-11-08 NOTE — NUR
Problems reprioritized. Patient report given, questions answered & plan of care reviewed 
with Dulce URENA.

## 2021-11-08 NOTE — NUR
Problems reprioritized. Patient report given, questions answered & plan of care reviewed 
with Forest Jensen. 

-------------------------------------------------------------------------------

Addendum: 11/08/21 at 0612 by Brittnee Stallworth RN

-------------------------------------------------------------------------------

Amended: Links added.

## 2021-11-09 NOTE — NUR
PAGER ID:  [phone] 

MESSAGE:  SCOTTY SURG [id number] RE: [id number] NASHDYAN PATIENTS K+ 3.4 WOULD YOU WANT ME TO ADD THE 
REPLACEMENT PROTOCOL TO THE MED LIST. THANKS

-------------------------------------------------------------------------------

Addendum: 11/09/21 at 0918 by Thomas De Jesus RN

-------------------------------------------------------------------------------

MD RESPONDED ORDERS WERE RECEIVED AT THIS TIME.

## 2021-11-09 NOTE — NUR
Patient in room [facility]. I have received report from  Dulce URENA  and had the opportunity to 
ask questions and assume patient care.

## 2021-11-09 NOTE — NUR
Student documentation:

I have reviewed and agree with all interventions, assessments performed and documented by 
DAYANARA GROSSMAN.

## 2021-11-09 NOTE — NUR
Patient in room [facility]. I have received report from STEPHANIE URENA   and had the opportunity to 
ask questions and assume patient care.

## 2021-11-09 NOTE — NUR
Problems reprioritized. Patient report given, questions answered & plan of care reviewed 
with AYANNA Garg.

## 2021-11-09 NOTE — NUR
Student documentation:

I have reviewed and agree with all interventions, assessments performed and documented by 
Wes Student Nurse.

## 2021-11-09 NOTE — NUR
Patient in room [facility] 358A. I have received report from  AYANNA Garg  and had the opportunity to 
ask questions and assume patient care.

## 2021-11-10 NOTE — NUR
Problems reprioritized. Patient report given, questions answered & plan of care reviewed 
with AYANNA Sweeney.

## 2021-11-10 NOTE — NUR
Patient in room [facility]. I have received report from AYANNA Sweeney   and had the opportunity to 
ask questions and assume patient care.

-------------------------------------------------------------------------------

Addendum: 11/11/21 at 0421 by Jose Alfredo Woods RN

-------------------------------------------------------------------------------

Amended: Links added.

## 2021-11-10 NOTE — NUR
PAGER ID:  [phone] 

MESSAGE:  Harpreet Castillo 358A- FYI- Pt still sounds very wet, lots of crackles & wheezes, 
cannot lie flat. Urine output so far 1100ml. Lasix 40mg given this am. He is SL. I just an 
FYI. /46, RR18, 98.0 oral, HR 96. Thank you. Zahra BLUE [phone]

## 2021-11-11 NOTE — NUR
Student documentation:

I have reviewed and agree with all interventions, assessments performed and documented by 
Esther Burks [facility] student.

## 2021-11-11 NOTE — NUR
Problems reprioritized. Patient report given, questions answered & plan of care reviewed 
with AYANNA Colbert traveler.

## 2021-11-11 NOTE — NUR
Initial: Pt admit for pyelonephritis, UTI, and PNA. Per MD note pt with sepsis secondary to 
E. coli bacteremia and UTI and with iron deficiency anemia, currently receiving routine 
iron. Pt on a regular diet and eating poorly throughout LOS, documented with 25-50% PO 
intake x 4 meals out of 12 meals since admit. Discussed patient's intake with RN who reports 
pt has been sleeping through meals however has been consuming liquids. RN reports pt is able 
to be independent and just requires direction during meal times. RN also reports pt is 
improving today so hopeful that PO intake will improve. D/w RN recommendation for Ensure 
Enlive TID given RN reports of pt being accepting of liquids and d/w dietary to send shakes 
with meals to optimize PO intake. RN denies noticing pt with any difficulty 
chewing/swallowing. LBM 11/10, receiving routine bowel care. Will continue to follow closely 
and monitor need for further nutrition intervention.

Recommendations:

1) Continue regular diet

2) Ensure Enlive TIDWM

3) Smoothie WB, shake BIDLD

4) Encourage PO intake of meals and ONS; monitor need for meal assistance

5) Continue iron replacement for iron deficiency anemia per MD

6) Routine bowel care

7) Weekly scaled weights

-------------------------------------------------------------------------------

Addendum: 11/11/21 at 1218 by Toya Reddy RD

-------------------------------------------------------------------------------

Amended: Links added.

## 2021-11-11 NOTE — NUR
Patient in room [facility]. I have received report from  Ching URENA  and had the opportunity to 
ask questions and assume patient care.

## 2021-11-11 NOTE — NUR
Problems reprioritized. Patient report given, questions answered & plan of care reviewed 
with AYANNA Sweeney. 

-------------------------------------------------------------------------------

Addendum: 11/11/21 at 0646 by Jose Alfredo Woods RN

-------------------------------------------------------------------------------

Amended: Links added.

## 2021-11-11 NOTE — NUR
Problems reprioritized. Patient report given, questions answered & plan of care reviewed 
with Filemon URENA Traveler.

## 2021-11-12 NOTE — NUR
Patient in room [facility]. I have received report from LOUISE URENA and had the opportunity to 
ask questions and assume patient care.

## 2021-11-13 NOTE — NUR
Problems reprioritized. Patient report given, questions answered & plan of care reviewed 
with JAYDON URENA.

## 2021-11-13 NOTE — NUR
Student documentation:

I have reviewed and agree with all interventions, assessments performed and documented by 
LAPREET STUDENT.

## 2021-11-13 NOTE — NUR
Problems reprioritized. Patient report given, questions answered & plan of care reviewed 
with Hannah.

## 2021-11-13 NOTE — NUR
PAGER ID:  [phone] 

MESSAGE:  Hannah Surg [phone] Re: 358A please call re: bleeding scrotum and hardness

## 2021-11-13 NOTE — NUR
PAGER ID:  [phone] 

MESSAGE:  Hannah Surg [phone] Re: 358A can we get orders for pain medication patient having back 
pain

## 2021-11-13 NOTE — NUR
PAGER ID:  [phone] 

MESSAGE:  DYAN Castillo. Can I get a dose of Lasix, he seems SOB with wheezing, Not a lot of 
output as well. I turned his IVF down to 50, they were at 100 ml/hr. Hannah [phone]

## 2021-11-13 NOTE — NUR
I have received report from  Kellen RN  and had the opportunity to ask questions and 
assume patient care.

## 2021-11-13 NOTE — NUR
Student Medication Administration:

For this medication-pass time frame, all medication were reviewed, dispensed, administered 
and documented per hospital policy by LISSETH STUDENT.

## 2021-11-14 NOTE — NUR
I have received report from  NOC shift RN  and had the opportunity to ask questions and 
assume patient care.

## 2021-11-14 NOTE — NUR
Reassessment: Pt placed on MM5 diet 11/13 though has had mostly 0% intake 

of meals; also consuming mostly 100% of ONS. Pt noted to be A&O x 1, 

though noted to be independent w/ meals. Pt may benefit from appetite 

stimulant per MD discretion. LBM 11/13 receiving routine colace. Pt also 

w/ D5 at 60ml/hr providing 245kcals. Will continue to monitor.

Recommendations:

1) Continue regular diet; assist w/ meals

2) Ensure Enlive TIDWM

3) Nichole TOUSSAINT, shake BIDLD

4) Encourage PO intake of meals and ONS; monitor need for meal assistance

5) Appetite stimulant per MD discretion

6) Continue iron replacement for iron deficiency anemia per MD

7) Routine bowel care

8) Weekly scaled weights

-------------------------------------------------------------------------------

Addendum: 11/14/21 at 1440 by Con Medina RD

-------------------------------------------------------------------------------

Amended: Links added.

## 2021-11-14 NOTE — NUR
Problems reprioritized. Patient report given, questions answered & plan of care reviewed 
with Racquel URENA.

## 2021-11-14 NOTE — NUR
Student documentation:

I have reviewed all interventions, assessments performed and documented by Aquiles GROSSMAN from 
[facility].



Student Medication Administration:

For all medication-passes in the time frame from 9143-7647, all medication were reviewed, 
dispensed, administered and documented per hospital policy by Aquiles GROSSMAN from [facility]. 
All heparin and Lovenox was approved and verified by primary RN to give.

## 2021-11-14 NOTE — NUR
Patient in room [facility]. I have received report from  Racquel URENA  and had the opportunity to 
ask questions and assume patient care.

## 2021-11-15 NOTE — NUR
Patient in room [facility]. I have received report from  AYANNA Clement  and had the opportunity 
to ask questions and assume patient care.

## 2021-11-15 NOTE — NUR
Problems reprioritized. Patient report given, questions answered & plan of care reviewed 
with AYANNA HADLEY.

## 2021-11-15 NOTE — NUR
Student documentation:

I have reviewed and agree with all interventions, assessments performed and documented by 
SN KOBY.

Student Medication Administration:

For this medication-pass time frame, all medication were reviewed, dispensed, administered 
and documented per hospital policy by SN KOBY.

## 2021-11-15 NOTE — NUR
Problems reprioritized. Patient report given, questions answered & plan of care reviewed 
with AYANNA Stallworth. .

## 2021-11-15 NOTE — NUR
PAGER ID:  [phone] 

MESSAGE:  358B- Harpreet Castillo- critical H/H 7.0/22.2. 123/54, 97.7, 88, 16, 92-96% RA. Not 
feeling well. Sounding wheezy. ok for RT? - Basim [phone]

## 2021-11-16 NOTE — NUR
Patient in room [facility]. I have received report from AYANNA HADLEY   and had the opportunity 
to ask questions and assume patient care.

## 2021-11-16 NOTE — NUR
Problems reprioritized. Patient report given, questions answered & plan of care reviewed 
with AYANNA Stallworth.

## 2021-11-16 NOTE — NUR
PAGER ID:  [phone] 

MESSAGE:  358A- Harpreet Castillo- need discharge order. [place] [facility] asking if you can write a 
triplicate or escript pain meds to Sha. Thank you- Basim [phone]

## 2021-11-16 NOTE — NUR
Reassessment: Pt placed on MM5 diet 11/13 though has had mostly 0-25% 

intake of meals; also consuming avg 50% x 5 ONS. Pt noted to be A&O x 1,

w/ minimal assist w/ meals. Pt may benefit from appetite stimulant per MD 

discretion. LBM 11/15 w/ PRN bowel care available. Pt also

w/ D5 at 60ml/hr providing 245kcals. Will continue to monitor.

Recommendations:

1) Continue regular diet; assist w/ meals

2) Ensure Enlive TIDWM

3) Nichole WB, shake BIDLD

4) Encourage PO intake of meals and ONS; monitor need for meal assistance

5) Appetite stimulant per MD discretion

6) Continue iron replacement for iron deficiency anemia per MD

7) Routine bowel care

8) Weekly scaled weights

-------------------------------------------------------------------------------

Addendum: 11/16/21 at 0945 by Con Medina RD

-------------------------------------------------------------------------------

Amended: Links added.

## 2022-02-26 NOTE — NUR
CALLED AND TALKED TO POMARIA ALEJANDRA HIDALGO WHO CONSULTED WITH SON AND AGGIRRED LET 
NATURE TAKE ITS CORSE AND CONTINUE HOSPICE AND NOT AGGRESSIVELY TREAT PTS HEAD 
BLEED

## 2022-02-27 NOTE — NUR
Patient in room [facility]. I have received report and had the opportunity to ask questions and 
assume patient care.

## 2022-02-27 NOTE — NUR
Problems reprioritized. Patient report given, questions answered & plan of care reviewed 
with DIONNE URENA. .

## 2022-02-27 NOTE — NUR
Student documentation:

I have reviewed all interventions, assessments performed and documented by Dana GROSSMAN.



Student Medication Administration:

For all medication-pass' in the time frame of 4294-5100, all medications were reviewed, 
dispensed, administered and documented per hospital policy by Dana GROSSMAN.

## 2022-02-28 NOTE — NUR
Spoke to Dr Johnson regarding patients canales was Dc'd per his orders. Does Dr Johnson 
want patient to stay until he voids. Per Genet URENA that removed patients canales patient does 
not have any edema at this time. Patient states he does not normally have any difficulty 
voiding. Per Dr Johnson ok to sent patient home. I discussed with patient and his wife at 
bedside, if patient has any difficulty urinating he can contact his primary Doctor or return 
to the Er.

## 2022-02-28 NOTE — NUR
Patient discharged with [facility] transport back to [place]. Pain medication given 
on discharge for the transport home.